# Patient Record
Sex: FEMALE | Race: WHITE | NOT HISPANIC OR LATINO | Employment: FULL TIME | URBAN - METROPOLITAN AREA
[De-identification: names, ages, dates, MRNs, and addresses within clinical notes are randomized per-mention and may not be internally consistent; named-entity substitution may affect disease eponyms.]

---

## 2023-11-17 ENCOUNTER — OFFICE VISIT (OUTPATIENT)
Dept: PRIMARY CARE | Facility: CLINIC | Age: 53
End: 2023-11-17
Payer: COMMERCIAL

## 2023-11-17 VITALS
OXYGEN SATURATION: 98 % | WEIGHT: 157 LBS | HEART RATE: 72 BPM | HEIGHT: 60 IN | DIASTOLIC BLOOD PRESSURE: 76 MMHG | BODY MASS INDEX: 30.82 KG/M2 | SYSTOLIC BLOOD PRESSURE: 138 MMHG

## 2023-11-17 DIAGNOSIS — E55.9 VITAMIN D DEFICIENCY: ICD-10-CM

## 2023-11-17 DIAGNOSIS — Z13.29 SCREENING FOR THYROID DISORDER: ICD-10-CM

## 2023-11-17 DIAGNOSIS — Z00.00 ANNUAL PHYSICAL EXAM: Primary | ICD-10-CM

## 2023-11-17 DIAGNOSIS — E78.2 MIXED HYPERLIPIDEMIA: ICD-10-CM

## 2023-11-17 DIAGNOSIS — Z12.11 SCREENING FOR COLON CANCER: ICD-10-CM

## 2023-11-17 DIAGNOSIS — Z12.31 SCREENING MAMMOGRAM FOR BREAST CANCER: ICD-10-CM

## 2023-11-17 DIAGNOSIS — Z13.1 SCREENING FOR DIABETES MELLITUS: ICD-10-CM

## 2023-11-17 DIAGNOSIS — Z23 ENCOUNTER FOR IMMUNIZATION: ICD-10-CM

## 2023-11-17 PROBLEM — N93.9 ABNORMAL UTERINE BLEEDING (AUB): Status: ACTIVE | Noted: 2023-11-17

## 2023-11-17 PROBLEM — N92.4 ABNORMAL PERIMENOPAUSAL BLEEDING: Status: ACTIVE | Noted: 2023-11-17

## 2023-11-17 PROBLEM — F41.9 ANXIETY: Status: ACTIVE | Noted: 2023-11-17

## 2023-11-17 PROBLEM — R87.618 UNEXPLAINED ENDOMETRIAL CELLS ON CERVICAL PAP SMEAR: Status: ACTIVE | Noted: 2023-11-17

## 2023-11-17 PROBLEM — E78.5 HYPERLIPIDEMIA: Status: ACTIVE | Noted: 2023-11-17

## 2023-11-17 PROBLEM — R93.89 ENDOMETRIAL STRIPE INCREASED: Status: ACTIVE | Noted: 2023-11-17

## 2023-11-17 PROCEDURE — 90472 IMMUNIZATION ADMIN EACH ADD: CPT

## 2023-11-17 PROCEDURE — 90471 IMMUNIZATION ADMIN: CPT

## 2023-11-17 PROCEDURE — 90686 IIV4 VACC NO PRSV 0.5 ML IM: CPT

## 2023-11-17 PROCEDURE — 99396 PREV VISIT EST AGE 40-64: CPT

## 2023-11-17 PROCEDURE — 1036F TOBACCO NON-USER: CPT

## 2023-11-17 PROCEDURE — 90715 TDAP VACCINE 7 YRS/> IM: CPT

## 2023-11-17 RX ORDER — ESCITALOPRAM OXALATE 10 MG/1
10 TABLET ORAL DAILY
COMMUNITY

## 2023-11-17 ASSESSMENT — ENCOUNTER SYMPTOMS
DIARRHEA: 0
LIGHT-HEADEDNESS: 0
MYALGIAS: 0
FATIGUE: 0
NAUSEA: 0
FEVER: 0
NERVOUS/ANXIOUS: 0
PALPITATIONS: 0
HEMATURIA: 0
ADENOPATHY: 0
DYSURIA: 0
BLOOD IN STOOL: 0
SORE THROAT: 0
COUGH: 0
WHEEZING: 0
VOMITING: 0
DIFFICULTY URINATING: 0
SHORTNESS OF BREATH: 0
ARTHRALGIAS: 0
DIAPHORESIS: 0

## 2023-11-17 ASSESSMENT — PAIN SCALES - GENERAL: PAINLEVEL: 0-NO PAIN

## 2023-11-17 ASSESSMENT — PATIENT HEALTH QUESTIONNAIRE - PHQ9
SUM OF ALL RESPONSES TO PHQ9 QUESTIONS 1 AND 2: 0
2. FEELING DOWN, DEPRESSED OR HOPELESS: NOT AT ALL
1. LITTLE INTEREST OR PLEASURE IN DOING THINGS: NOT AT ALL

## 2023-11-17 NOTE — PROGRESS NOTES
Subjective   Patient ID: Sridevi Beach is a 52 y.o. female who presents for Annual Exam (Pt is here for a physical with no concerns /la).    Hx of Anxiety, HLD    Other providers: Dr. Zoe Porras (Ob/gyn). [facility].     Anxiety: controlled on 10 mg Lexapro. Managed by [facility].     HLD: last ASCVD calculated at 1.8%. No medication at this time, treating with lifestyle. lost 20 lbs since beginning of year, working with  for 6 days a week and eating healthy.Labs ordered.      Social hx: Lives at home with , two children (ages 17, 21). 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    Fhx: reviewed.     HM: PAP UTD 6/2021 neg/neg. Mammogram due. Colon screening, will order cologuard. Lung screening never smoker. DEXA start 65. Vaccinations Tdap and flu due. Will hold off on shingrex.          Review of Systems   Constitutional:  Negative for diaphoresis, fatigue and fever.   HENT:  Negative for congestion, hearing loss and sore throat.    Eyes:  Negative for visual disturbance.   Respiratory:  Negative for cough, shortness of breath and wheezing.    Cardiovascular:  Negative for chest pain, palpitations and leg swelling.   Gastrointestinal:  Negative for blood in stool, diarrhea, nausea and vomiting.   Genitourinary:  Negative for difficulty urinating, dysuria and hematuria.   Musculoskeletal:  Negative for arthralgias and myalgias.   Skin:  Negative for rash.   Allergic/Immunologic: Negative for immunocompromised state.   Neurological:  Negative for syncope and light-headedness.   Hematological:  Negative for adenopathy.   Psychiatric/Behavioral:  The patient is not nervous/anxious.        Objective   /76   Pulse 72   Ht 1.524 m (5')   Wt 71.2 kg (157 lb)   SpO2 98%   BMI 30.66 kg/m²     Physical Exam  Constitutional:       General: She is not in acute distress.     Appearance: Normal appearance.   HENT:      Right Ear: Tympanic membrane and ear canal normal.       Left Ear: Tympanic membrane and ear canal normal.      Nose: Nose normal.      Mouth/Throat:      Mouth: Mucous membranes are moist.      Pharynx: Oropharynx is clear. No oropharyngeal exudate or posterior oropharyngeal erythema.   Eyes:      Extraocular Movements: Extraocular movements intact.      Conjunctiva/sclera: Conjunctivae normal.      Pupils: Pupils are equal, round, and reactive to light.   Neck:      Thyroid: No thyroid mass or thyromegaly.   Cardiovascular:      Rate and Rhythm: Normal rate and regular rhythm.      Pulses: Normal pulses.           Posterior tibial pulses are 2+ on the right side and 2+ on the left side.      Heart sounds: No murmur heard.     No gallop.   Pulmonary:      Effort: Pulmonary effort is normal.      Breath sounds: No wheezing, rhonchi or rales.   Abdominal:      General: Bowel sounds are normal.      Palpations: Abdomen is soft. There is no hepatomegaly, splenomegaly or mass.      Tenderness: There is no abdominal tenderness. There is no guarding.   Musculoskeletal:         General: Normal range of motion.      Right lower leg: No edema.      Left lower leg: No edema.   Lymphadenopathy:      Cervical: No cervical adenopathy.   Skin:     General: Skin is warm.      Findings: No rash.   Neurological:      General: No focal deficit present.      Mental Status: She is alert.   Psychiatric:         Mood and Affect: Mood normal.         Thought Content: Thought content normal.         Assessment/Plan   Diagnoses and all orders for this visit:  Annual physical exam  Mixed hyperlipidemia  -     Lipid Panel; Future  Vitamin D deficiency  -     Vitamin D 25-Hydroxy,Total (for eval of Vitamin D levels); Future  Screening for diabetes mellitus  -     Comprehensive Metabolic Panel; Future  Screening for thyroid disorder  -     TSH with reflex to Free T4 if abnormal; Future  Screening mammogram for breast cancer  -     BI mammo bilateral screening tomosynthesis; Future  Screening for  colon cancer  -     Cologuard® colon cancer screening; Future  Encounter for immunization  -     Flu vaccine (IIV4) age 6 months and greater, preservative free  Other orders  -     Tdap vaccine, age 7 years and older  (BOOSTRIX)

## 2023-12-08 ENCOUNTER — TELEPHONE (OUTPATIENT)
Dept: PRIMARY CARE | Facility: CLINIC | Age: 53
End: 2023-12-08
Payer: COMMERCIAL

## 2023-12-08 ENCOUNTER — ANCILLARY PROCEDURE (OUTPATIENT)
Dept: RADIOLOGY | Facility: CLINIC | Age: 53
End: 2023-12-08
Payer: COMMERCIAL

## 2023-12-08 VITALS — WEIGHT: 153 LBS | BODY MASS INDEX: 30.04 KG/M2 | HEIGHT: 60 IN

## 2023-12-08 DIAGNOSIS — Z12.31 SCREENING MAMMOGRAM FOR BREAST CANCER: ICD-10-CM

## 2023-12-08 DIAGNOSIS — R92.8 ABNORMAL SCREENING MAMMOGRAM: Primary | ICD-10-CM

## 2023-12-08 PROCEDURE — 77067 SCR MAMMO BI INCL CAD: CPT

## 2023-12-08 NOTE — TELEPHONE ENCOUNTER
Abnormal screening mammogram, needs additional imaging. Left diagnostic mammogram already ordered, however also needs right US. Order placed. Make sure patient has scheduled.

## 2023-12-14 ENCOUNTER — HOSPITAL ENCOUNTER (OUTPATIENT)
Dept: RADIOLOGY | Facility: HOSPITAL | Age: 53
Discharge: HOME | End: 2023-12-14
Payer: COMMERCIAL

## 2023-12-14 ENCOUNTER — TELEPHONE (OUTPATIENT)
Dept: PRIMARY CARE | Facility: CLINIC | Age: 53
End: 2023-12-14
Payer: COMMERCIAL

## 2023-12-14 DIAGNOSIS — R92.8 OTHER ABNORMAL AND INCONCLUSIVE FINDINGS ON DIAGNOSTIC IMAGING OF BREAST: ICD-10-CM

## 2023-12-14 PROCEDURE — 77065 DX MAMMO INCL CAD UNI: CPT | Mod: LT

## 2023-12-14 PROCEDURE — 76642 ULTRASOUND BREAST LIMITED: CPT | Mod: RT

## 2023-12-14 NOTE — TELEPHONE ENCOUNTER
Repeat imaging of breast shows everything is normal, and can return to yearly mammogram screening.

## 2023-12-15 NOTE — TELEPHONE ENCOUNTER
Pt called back and verbally understands. Pt would like the other order for the right breast cancelled due to her keep getting reminder calls to get it done.

## 2023-12-18 ENCOUNTER — LAB (OUTPATIENT)
Dept: LAB | Facility: LAB | Age: 53
End: 2023-12-18
Payer: COMMERCIAL

## 2023-12-18 DIAGNOSIS — E55.9 VITAMIN D DEFICIENCY: ICD-10-CM

## 2023-12-18 DIAGNOSIS — E78.2 MIXED HYPERLIPIDEMIA: ICD-10-CM

## 2023-12-18 DIAGNOSIS — Z13.29 SCREENING FOR THYROID DISORDER: ICD-10-CM

## 2023-12-18 DIAGNOSIS — Z13.1 SCREENING FOR DIABETES MELLITUS: ICD-10-CM

## 2023-12-18 LAB
25(OH)D3 SERPL-MCNC: 22 NG/ML (ref 31–100)
ALBUMIN SERPL-MCNC: 4.3 G/DL (ref 3.5–5)
ALP BLD-CCNC: 99 U/L (ref 35–125)
ALT SERPL-CCNC: 14 U/L (ref 5–40)
ANION GAP SERPL CALC-SCNC: 8 MMOL/L
AST SERPL-CCNC: 16 U/L (ref 5–40)
BILIRUB SERPL-MCNC: <0.2 MG/DL (ref 0.1–1.2)
BUN SERPL-MCNC: 15 MG/DL (ref 8–25)
CALCIUM SERPL-MCNC: 9 MG/DL (ref 8.5–10.4)
CHLORIDE SERPL-SCNC: 105 MMOL/L (ref 97–107)
CHOLEST SERPL-MCNC: 238 MG/DL (ref 133–200)
CHOLEST/HDLC SERPL: 4.8 {RATIO}
CO2 SERPL-SCNC: 29 MMOL/L (ref 24–31)
CREAT SERPL-MCNC: 0.9 MG/DL (ref 0.4–1.6)
GFR SERPL CREATININE-BSD FRML MDRD: 76 ML/MIN/1.73M*2
GLUCOSE SERPL-MCNC: 98 MG/DL (ref 65–99)
HDLC SERPL-MCNC: 50 MG/DL
LDLC SERPL CALC-MCNC: 169 MG/DL (ref 65–130)
POTASSIUM SERPL-SCNC: 4.5 MMOL/L (ref 3.4–5.1)
PROT SERPL-MCNC: 6.9 G/DL (ref 5.9–7.9)
SODIUM SERPL-SCNC: 142 MMOL/L (ref 133–145)
TRIGL SERPL-MCNC: 96 MG/DL (ref 40–150)
TSH SERPL DL<=0.05 MIU/L-ACNC: 3.68 MIU/L (ref 0.27–4.2)

## 2023-12-18 PROCEDURE — 80053 COMPREHEN METABOLIC PANEL: CPT

## 2023-12-18 PROCEDURE — 36415 COLL VENOUS BLD VENIPUNCTURE: CPT

## 2023-12-18 PROCEDURE — 84443 ASSAY THYROID STIM HORMONE: CPT

## 2023-12-18 PROCEDURE — 80061 LIPID PANEL: CPT

## 2023-12-18 PROCEDURE — 82306 VITAMIN D 25 HYDROXY: CPT

## 2024-01-30 LAB — NONINV COLON CA DNA+OCC BLD SCRN STL QL: NEGATIVE

## 2024-11-22 ENCOUNTER — CLINICAL SUPPORT (OUTPATIENT)
Dept: PRIMARY CARE | Facility: CLINIC | Age: 54
End: 2024-11-22
Payer: COMMERCIAL

## 2024-11-22 DIAGNOSIS — Z23 ENCOUNTER FOR IMMUNIZATION: ICD-10-CM

## 2024-11-22 PROCEDURE — 90471 IMMUNIZATION ADMIN: CPT

## 2024-11-22 PROCEDURE — 90656 IIV3 VACC NO PRSV 0.5 ML IM: CPT

## 2024-12-13 ENCOUNTER — OFFICE VISIT (OUTPATIENT)
Dept: PRIMARY CARE | Facility: CLINIC | Age: 54
End: 2024-12-13
Payer: COMMERCIAL

## 2024-12-13 VITALS
DIASTOLIC BLOOD PRESSURE: 74 MMHG | SYSTOLIC BLOOD PRESSURE: 122 MMHG | WEIGHT: 157 LBS | BODY MASS INDEX: 30.82 KG/M2 | OXYGEN SATURATION: 98 % | HEIGHT: 60 IN | HEART RATE: 71 BPM

## 2024-12-13 DIAGNOSIS — E78.2 MIXED HYPERLIPIDEMIA: ICD-10-CM

## 2024-12-13 DIAGNOSIS — Z13.6 SCREENING FOR HEART DISEASE: ICD-10-CM

## 2024-12-13 DIAGNOSIS — F41.9 ANXIETY: ICD-10-CM

## 2024-12-13 DIAGNOSIS — Z00.00 ANNUAL PHYSICAL EXAM: Primary | ICD-10-CM

## 2024-12-13 DIAGNOSIS — E55.9 VITAMIN D DEFICIENCY: ICD-10-CM

## 2024-12-13 DIAGNOSIS — Z12.31 SCREENING MAMMOGRAM FOR BREAST CANCER: ICD-10-CM

## 2024-12-13 DIAGNOSIS — Z13.1 SCREENING FOR DIABETES MELLITUS: ICD-10-CM

## 2024-12-13 DIAGNOSIS — Z83.49 FAMILY HISTORY OF THYROID DISEASE: ICD-10-CM

## 2024-12-13 DIAGNOSIS — Z11.59 ENCOUNTER FOR HEPATITIS C SCREENING TEST FOR LOW RISK PATIENT: ICD-10-CM

## 2024-12-13 PROBLEM — R87.618 UNEXPLAINED ENDOMETRIAL CELLS ON CERVICAL PAP SMEAR: Status: RESOLVED | Noted: 2023-11-17 | Resolved: 2024-12-13

## 2024-12-13 PROBLEM — R93.89 ENDOMETRIAL STRIPE INCREASED: Status: RESOLVED | Noted: 2023-11-17 | Resolved: 2024-12-13

## 2024-12-13 PROBLEM — N93.9 ABNORMAL UTERINE BLEEDING (AUB): Status: RESOLVED | Noted: 2023-11-17 | Resolved: 2024-12-13

## 2024-12-13 PROBLEM — N92.4 ABNORMAL PERIMENOPAUSAL BLEEDING: Status: RESOLVED | Noted: 2023-11-17 | Resolved: 2024-12-13

## 2024-12-13 PROCEDURE — 99396 PREV VISIT EST AGE 40-64: CPT

## 2024-12-13 PROCEDURE — 3008F BODY MASS INDEX DOCD: CPT

## 2024-12-13 PROCEDURE — 1036F TOBACCO NON-USER: CPT

## 2024-12-13 ASSESSMENT — ENCOUNTER SYMPTOMS
ARTHRALGIAS: 0
NAUSEA: 0
WHEEZING: 0
VOMITING: 0
FEVER: 0
PALPITATIONS: 0
HEMATURIA: 0
FATIGUE: 0
SORE THROAT: 0
DIARRHEA: 0
MYALGIAS: 0
SHORTNESS OF BREATH: 0
LIGHT-HEADEDNESS: 0
BLOOD IN STOOL: 0
DYSURIA: 0
COUGH: 0
ADENOPATHY: 0
DIFFICULTY URINATING: 0
DIAPHORESIS: 0

## 2024-12-13 ASSESSMENT — PATIENT HEALTH QUESTIONNAIRE - PHQ9
2. FEELING DOWN, DEPRESSED OR HOPELESS: NOT AT ALL
1. LITTLE INTEREST OR PLEASURE IN DOING THINGS: NOT AT ALL
SUM OF ALL RESPONSES TO PHQ9 QUESTIONS 1 AND 2: 0

## 2024-12-13 ASSESSMENT — PAIN SCALES - GENERAL: PAINLEVEL_OUTOF10: 0-NO PAIN

## 2024-12-13 NOTE — PROGRESS NOTES
Subjective   Patient ID: Jose Beach is a 54 y.o. female who presents for Annual Exam.    Hx of Anxiety, HLD    Other providers: Dr. Zoe Porras (Ob/gyn). [facility].     Anxiety: controlled on 10 mg Lexapro. Managed by [facility].     HLD: last ASCVD calculated at 2.4%. No medication at this time, treating with lifestyle. Labs ordered. CT calcium ordered.     Social hx: Lives at home with , two children (ages 18, 22). 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    Fhx: reviewed.     HM: PAP UTD 6/2021 neg/neg. Mammogram due. Colon screening, cologuard 1/22/2025. Lung screening never smoker. DEXA start 65. Vaccinations Tdap 11/2023 and flu UTD. Will schedule shingrex.         Review of Systems   Constitutional:  Negative for diaphoresis, fatigue and fever.   HENT:  Negative for congestion, hearing loss and sore throat.    Eyes:  Negative for visual disturbance.   Respiratory:  Negative for cough, shortness of breath and wheezing.    Cardiovascular:  Negative for chest pain, palpitations and leg swelling.   Gastrointestinal:  Negative for blood in stool, diarrhea, nausea and vomiting.   Genitourinary:  Negative for difficulty urinating, dysuria and hematuria.   Musculoskeletal:  Negative for arthralgias and myalgias.   Skin:  Negative for rash.   Allergic/Immunologic: Negative for immunocompromised state.   Neurological:  Negative for syncope and light-headedness.   Hematological:  Negative for adenopathy.   Psychiatric/Behavioral:  Negative for suicidal ideas.        Objective   /74   Pulse 71   Ht 1.524 m (5')   Wt 71.2 kg (157 lb)   SpO2 98%   BMI 30.66 kg/m²     Physical Exam  Constitutional:       General: She is not in acute distress.     Appearance: Normal appearance.   HENT:      Right Ear: Tympanic membrane and ear canal normal.      Left Ear: Tympanic membrane and ear canal normal.      Nose: Nose normal.      Mouth/Throat:      Mouth: Mucous membranes are  moist.      Pharynx: Oropharynx is clear. No oropharyngeal exudate or posterior oropharyngeal erythema.   Eyes:      Extraocular Movements: Extraocular movements intact.      Conjunctiva/sclera: Conjunctivae normal.      Pupils: Pupils are equal, round, and reactive to light.   Neck:      Thyroid: No thyroid mass or thyromegaly.   Cardiovascular:      Rate and Rhythm: Normal rate and regular rhythm.      Pulses: Normal pulses.      Heart sounds: No murmur heard.     No gallop.   Pulmonary:      Effort: Pulmonary effort is normal.      Breath sounds: No wheezing, rhonchi or rales.   Abdominal:      General: Bowel sounds are normal.      Palpations: Abdomen is soft. There is no hepatomegaly, splenomegaly or mass.      Tenderness: There is no abdominal tenderness. There is no guarding.   Musculoskeletal:         General: Normal range of motion.      Right lower leg: No edema.      Left lower leg: No edema.   Lymphadenopathy:      Cervical: No cervical adenopathy.   Skin:     General: Skin is warm.      Findings: No rash.   Neurological:      General: No focal deficit present.      Mental Status: She is alert.      Motor: Motor function is intact. No weakness.   Psychiatric:         Mood and Affect: Mood normal.         Thought Content: Thought content normal.         Assessment/Plan   Diagnoses and all orders for this visit:  Annual physical exam  Screening mammogram for breast cancer  -     BI mammo bilateral screening tomosynthesis; Future  Anxiety  Mixed hyperlipidemia  -     Lipid panel; Future  Vitamin D deficiency  -     Vitamin D 25-Hydroxy,Total (for eval of Vitamin D levels); Future  Encounter for hepatitis C screening test for low risk patient  -     Hepatitis C Antibody; Future  Family history of thyroid disease  -     Tsh With Reflex To Free T4 If Abnormal; Future  Screening for diabetes mellitus  -     Basic metabolic panel; Future  Screening for heart disease  -     CT cardiac scoring wo IV contrast;  Future  Follow-up one year for CPE

## 2025-01-03 ENCOUNTER — CLINICAL SUPPORT (OUTPATIENT)
Dept: PRIMARY CARE | Facility: CLINIC | Age: 55
End: 2025-01-03
Payer: COMMERCIAL

## 2025-01-03 DIAGNOSIS — Z23 ENCOUNTER FOR IMMUNIZATION: ICD-10-CM

## 2025-01-03 PROCEDURE — 90471 IMMUNIZATION ADMIN: CPT

## 2025-01-03 PROCEDURE — 90750 HZV VACC RECOMBINANT IM: CPT

## 2025-04-19 LAB
25(OH)D3+25(OH)D2 SERPL-MCNC: 25 NG/ML (ref 30–100)
ANION GAP SERPL CALCULATED.4IONS-SCNC: 8 MMOL/L (CALC) (ref 7–17)
BUN SERPL-MCNC: 24 MG/DL (ref 7–25)
BUN/CREAT SERPL: NORMAL (CALC) (ref 6–22)
CALCIUM SERPL-MCNC: 8.6 MG/DL (ref 8.6–10.4)
CHLORIDE SERPL-SCNC: 104 MMOL/L (ref 98–110)
CHOLEST SERPL-MCNC: 231 MG/DL
CHOLEST/HDLC SERPL: 4.7 (CALC)
CO2 SERPL-SCNC: 28 MMOL/L (ref 20–32)
CREAT SERPL-MCNC: 0.77 MG/DL (ref 0.5–1.03)
EGFRCR SERPLBLD CKD-EPI 2021: 91 ML/MIN/1.73M2
GLUCOSE SERPL-MCNC: 90 MG/DL (ref 65–99)
HCV AB SERPL QL IA: NORMAL
HDLC SERPL-MCNC: 49 MG/DL
LDLC SERPL CALC-MCNC: 159 MG/DL (CALC)
NONHDLC SERPL-MCNC: 182 MG/DL (CALC)
POTASSIUM SERPL-SCNC: 4.2 MMOL/L (ref 3.5–5.3)
SODIUM SERPL-SCNC: 140 MMOL/L (ref 135–146)
TRIGL SERPL-MCNC: 113 MG/DL
TSH SERPL-ACNC: 2.58 MIU/L

## 2025-04-25 ENCOUNTER — HOSPITAL ENCOUNTER (OUTPATIENT)
Dept: RADIOLOGY | Facility: CLINIC | Age: 55
Discharge: HOME | End: 2025-04-25
Payer: COMMERCIAL

## 2025-04-25 VITALS — BODY MASS INDEX: 30.82 KG/M2 | HEIGHT: 60 IN | WEIGHT: 157 LBS

## 2025-04-25 DIAGNOSIS — Z12.31 SCREENING MAMMOGRAM FOR BREAST CANCER: ICD-10-CM

## 2025-04-25 PROCEDURE — 77063 BREAST TOMOSYNTHESIS BI: CPT

## 2025-04-28 ENCOUNTER — TELEPHONE (OUTPATIENT)
Dept: PRIMARY CARE | Facility: CLINIC | Age: 55
End: 2025-04-28
Payer: COMMERCIAL

## 2025-04-28 NOTE — TELEPHONE ENCOUNTER
Needs further imaging on right side of breast, orders for US have already been placed. Make sure patient is aware.

## 2025-05-02 ENCOUNTER — CLINICAL SUPPORT (OUTPATIENT)
Dept: PRIMARY CARE | Facility: CLINIC | Age: 55
End: 2025-05-02
Payer: COMMERCIAL

## 2025-05-02 DIAGNOSIS — Z23 ENCOUNTER FOR IMMUNIZATION: ICD-10-CM

## 2025-05-02 PROCEDURE — 90750 HZV VACC RECOMBINANT IM: CPT

## 2025-05-02 PROCEDURE — 90471 IMMUNIZATION ADMIN: CPT

## 2025-05-13 ENCOUNTER — HOSPITAL ENCOUNTER (OUTPATIENT)
Dept: RADIOLOGY | Facility: HOSPITAL | Age: 55
Discharge: HOME | End: 2025-05-13
Payer: COMMERCIAL

## 2025-05-13 ENCOUNTER — TELEPHONE (OUTPATIENT)
Dept: PRIMARY CARE | Facility: CLINIC | Age: 55
End: 2025-05-13
Payer: COMMERCIAL

## 2025-05-13 DIAGNOSIS — R92.8 ABNORMAL MAMMOGRAM OF RIGHT BREAST: Primary | ICD-10-CM

## 2025-05-13 DIAGNOSIS — R92.8 OTHER ABNORMAL AND INCONCLUSIVE FINDINGS ON DIAGNOSTIC IMAGING OF BREAST: ICD-10-CM

## 2025-05-13 PROCEDURE — 76982 USE 1ST TARGET LESION: CPT | Mod: RT

## 2025-05-13 PROCEDURE — 76642 ULTRASOUND BREAST LIMITED: CPT | Mod: RT

## 2025-05-13 PROCEDURE — 76642 ULTRASOUND BREAST LIMITED: CPT | Mod: RIGHT SIDE | Performed by: RADIOLOGY

## 2025-05-13 NOTE — TELEPHONE ENCOUNTER
Further imaging shows that mass appears has benign/normal fibrocystic breast changes, will re-check in 6 months to show stability. Future order placed.

## 2025-11-18 ENCOUNTER — APPOINTMENT (OUTPATIENT)
Dept: RADIOLOGY | Facility: HOSPITAL | Age: 55
End: 2025-11-18
Payer: COMMERCIAL